# Patient Record
Sex: MALE | Race: WHITE
[De-identification: names, ages, dates, MRNs, and addresses within clinical notes are randomized per-mention and may not be internally consistent; named-entity substitution may affect disease eponyms.]

---

## 2019-10-25 ENCOUNTER — HOSPITAL ENCOUNTER (EMERGENCY)
Dept: HOSPITAL 47 - EC | Age: 24
Discharge: HOME | End: 2019-10-25
Payer: COMMERCIAL

## 2019-10-25 VITALS — HEART RATE: 115 BPM | SYSTOLIC BLOOD PRESSURE: 137 MMHG | DIASTOLIC BLOOD PRESSURE: 87 MMHG

## 2019-10-25 VITALS — TEMPERATURE: 98.1 F | RESPIRATION RATE: 18 BRPM

## 2019-10-25 DIAGNOSIS — Z85.9: ICD-10-CM

## 2019-10-25 DIAGNOSIS — Z94.81: ICD-10-CM

## 2019-10-25 DIAGNOSIS — F41.0: Primary | ICD-10-CM

## 2019-10-25 DIAGNOSIS — Z63.0: ICD-10-CM

## 2019-10-25 DIAGNOSIS — F41.9: ICD-10-CM

## 2019-10-25 DIAGNOSIS — R00.0: ICD-10-CM

## 2019-10-25 DIAGNOSIS — F17.200: ICD-10-CM

## 2019-10-25 PROCEDURE — 99283 EMERGENCY DEPT VISIT LOW MDM: CPT

## 2019-10-25 SDOH — SOCIAL STABILITY - SOCIAL INSECURITY: PROBLEMS IN RELATIONSHIP WITH SPOUSE OR PARTNER: Z63.0

## 2019-10-25 NOTE — ED
Anxiety HPI





- General


Chief Complaint: Anxiety


Stated Complaint: Anxiety


Time Seen by Provider: 10/25/19 17:21


Source: patient, RN notes reviewed, old records reviewed


Mode of arrival: ambulatory





- History of Present Illness


Initial Comments: 





This is a 24-year-old male the ER for evaluation patient admits to significant 

anxiety and stress, inability to sleep panic attacks 3 last 2 days.  Patient 

having multiple family issues recent passing of his mother because of severe 

distress and also his girlfriend is broke up with him.  He denies any illicit 

drugs or alcohol abuse he does take Vistaril on a regular basis for generalized 

anxiety states that right now is is not cutting it and not getting it done.  

Patient has no thoughts of self-harm or hurting someone else.  No other 

significant complaints


MD Complaint: anxiety


-: days(s)


Symptoms: palpitations, other (Panic attack)


Place: home


Previous History of Same: Yes


Severity: mild


Quality: intermittent, similar to prior episodes


Provoking factors: emotional stress (Girlfriend broke up with), recent 

death/illness of family member


Improves With: nothing


Worsens With: nothing





- Related Data


Home Medications: 


                                  Previous Rx's











 Medication  Instructions  Recorded


 


Diazepam [Valium] 5 mg PO Q8H PRN 3 Days #9 tab 10/25/19











Allergies/Adverse Reactions: 


                                    Allergies











Allergy/AdvReac Type Severity Reaction Status Date / Time


 


No Known Allergies Allergy   Verified 10/25/19 16:19














Review of Systems


ROS Statement: 


Those systems with pertinent positive or pertinent negative responses have been 

documented in the HPI.





ROS Other: All systems not noted in ROS Statement are negative.





Past Medical History


Past Medical History: Cancer


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 

old


History of Any Multi-Drug Resistant Organisms: None Reported


Past Surgical History: No Surgical Hx Reported


Past Psychological History: Anxiety


Smoking Status: Current every day smoker


Past Alcohol Use History: None Reported


Past Drug Use History: None Reported





General Exam


Limitations: no limitations


General appearance: alert, in no apparent distress


Head exam: Present: atraumatic, normocephalic, normal inspection


Eye exam: Present: normal appearance, PERRL, EOMI.  Absent: scleral icterus, 

conjunctival injection, periorbital swelling


ENT exam: Present: normal exam, mucous membranes moist


Neck exam: Present: normal inspection.  Absent: tenderness, meningismus, 

lymphadenopathy


Respiratory exam: Present: normal lung sounds bilaterally.  Absent: respiratory 

distress, wheezes, rales, rhonchi, stridor


Cardiovascular Exam: Present: normal rhythm, tachycardia, normal heart sounds.  

Absent: systolic murmur, diastolic murmur, rubs, gallop, clicks


GI/Abdominal exam: Present: soft, normal bowel sounds.  Absent: distended, 

tenderness, guarding, rebound, rigid


Extremities exam: Present: normal inspection, full ROM, normal capillary refill.

  Absent: tenderness, pedal edema, joint swelling, calf tenderness


Back exam: Present: normal inspection


Neurological exam: Present: alert, oriented X3, CN II-XII intact


Psychiatric exam: Present: normal affect, normal mood


Skin exam: Present: warm, dry, intact, normal color.  Absent: rash





Course





                                   Vital Signs











  10/25/19





  16:14


 


Temperature 98.1 F


 


Pulse Rate 144 H


 


Respiratory 18





Rate 


 


Blood Pressure 129/64


 


O2 Sat by Pulse 98





Oximetry 














- Reevaluation(s)


Reevaluation #1: 





10/25/19 17:43


Medical records reviewed


Reevaluation #2: 





10/25/19 17:59


Patient's anxiety is significantly improved





Medical Decision Making





- Medical Decision Making





24 male the ER for evaluation anxiety attack.  Patient will be given anxiety 

like for discharge, follow-up with primary care next week





Disposition


Clinical Impression: 


 Acute anxiety, Panic attack





Disposition: HOME SELF-CARE


Condition: Fair


Instructions (If sedation given, give patient instructions):  Generalized 

Anxiety Disorder (ED)


Prescriptions: 


Diazepam [Valium] 5 mg PO Q8H PRN 3 Days #9 tab


 PRN Reason: Anxiety


Is patient prescribed a controlled substance at d/c from ED?: Yes


When asked, does pt state using other controlled substances?: No


If prescribed controlled substance>3 days was MAPS reviewed?: Prescribed <3 Days


Referrals: 


Nonstaff,Physician [Primary Care Provider] - 1-2 days

## 2019-10-29 ENCOUNTER — HOSPITAL ENCOUNTER (EMERGENCY)
Dept: HOSPITAL 47 - EC | Age: 24
Discharge: HOME | End: 2019-10-29
Payer: COMMERCIAL

## 2019-10-29 VITALS
RESPIRATION RATE: 16 BRPM | HEART RATE: 72 BPM | SYSTOLIC BLOOD PRESSURE: 132 MMHG | DIASTOLIC BLOOD PRESSURE: 76 MMHG | TEMPERATURE: 98 F

## 2019-10-29 DIAGNOSIS — R11.2: Primary | ICD-10-CM

## 2019-10-29 DIAGNOSIS — R19.7: ICD-10-CM

## 2019-10-29 DIAGNOSIS — Z85.830: ICD-10-CM

## 2019-10-29 DIAGNOSIS — F41.9: ICD-10-CM

## 2019-10-29 DIAGNOSIS — F17.200: ICD-10-CM

## 2019-10-29 DIAGNOSIS — Z94.81: ICD-10-CM

## 2019-10-29 PROCEDURE — 96374 THER/PROPH/DIAG INJ IV PUSH: CPT

## 2019-10-29 PROCEDURE — 99283 EMERGENCY DEPT VISIT LOW MDM: CPT

## 2019-10-29 PROCEDURE — 96361 HYDRATE IV INFUSION ADD-ON: CPT

## 2019-10-29 NOTE — ED
Nausea/Vomiting/Diarrhea HPI





- General


Chief complaint: Nausea/Vomiting/Diarrhea


Stated complaint: Vomiting


Time Seen by Provider: 10/29/19 09:15


Source: patient


Mode of arrival: ambulatory


Limitations: no limitations





- History of Present Illness


Initial comments: 


24-year-old male with history of anxiety disorder presents emergency room for 

evaluation of nausea vomiting diarrhea 12 hours.  Patient states that he has 

had nausea vomiting diarrhea since 9:30 PM last night.  Patient denies any 

specific foods or ingested prior.  Patient denies any fevers states she has had 

chills.  Patient denies any severe abdominal pain.  Denies any melena or 

hematochezia.  Denies any emesis.  Patient denies any known sick contacts.  

Patient states the vomiting has slowed down this morning.  Patient states he 

does feel anxious and did not take his anti anxiety medications-5mg valium. 

Otherwise patient has no other complaints. Denies CP, SOB, back pain or urinary 

symptoms. Patient appears well on arrival.








- Related Data


                                  Previous Rx's











 Medication  Instructions  Recorded


 


Diazepam [Valium] 5 mg PO Q8H PRN 3 Days #9 tab 10/25/19


 


Ondansetron Odt [Zofran Odt] 4 mg PO Q8HR PRN 1 Days #3 tab 10/29/19











                                    Allergies











Allergy/AdvReac Type Severity Reaction Status Date / Time


 


No Known Allergies Allergy   Verified 10/29/19 09:19














Review of Systems


ROS Statement: 


Those systems with pertinent positive or pertinent negative responses have been 

documented in the HPI.





ROS Other: All systems not noted in ROS Statement are negative.





Past Medical History


Past Medical History: Cancer


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 

old, leukemia


History of Any Multi-Drug Resistant Organisms: None Reported


Past Surgical History: No Surgical Hx Reported


Past Psychological History: Anxiety


Smoking Status: Current every day smoker


Past Alcohol Use History: None Reported


Past Drug Use History: None Reported





General Exam





- General Exam Comments


Initial Comments: 


General:  The patient is awake and alert, in no distress, and does not appear 

acutely ill. 


Eye:  Pupils are equal, round and reactive to light, extra-ocular movements are 

intact.  No nystagmus.  There is normal conjunctiva bilaterally.  No signs of 

icterus.  


Ears, nose, mouth and throat:  There are moist mucous membranes and no oral 

lesions.   


Cardiovascular:  There is a regular rate and rhythm. No murmur, rub or gallop is

appreciated.


Respiratory:  Lungs are clear to auscultation, respirations are non-labored, 

breath sounds are equal.  No wheezes, stridor, rales, or rhonchi.


Gastrointestinal:  Soft, non-distended, non-tender abdomen without masses or 

organomegaly noted. There is no rebound or guarding present.  No CVA tenderness.

Bowel sounds are unremarkable.


Musculoskeletal:  Normal ROM, no tenderness.  Strength 5/5. Sensation intact. 

Radial pulses equal bilaterally 2+.  


Neurological:  A&O x 3. CN II-XII intact grossly, There are no obvious motor or 

sensory deficits. Coordination appears grossly intact. Speech is normal.


Skin:  Skin is warm and dry and no rashes or lesions are noted. 


Psychiatric:  Cooperativet.  





Limitations: no limitations





Course


                                   Vital Signs











  10/29/19 10/29/19





  09:10 10:31


 


Temperature 98.2 F 98.0 F


 


Pulse Rate 84 72


 


Respiratory 18 16





Rate  


 


Blood Pressure 119/71 132/76


 


O2 Sat by Pulse 98 99





Oximetry  














Medical Decision Making





- Medical Decision Making


24-year-old male presents emergency department for evaluation of vomiting, 

diarrhea. began last night, denies abdominal pain. abdominal exam benign, 

symptoms controlled. given IVF. Patient states he has chronic anxiety with f/u 

tomorrow to refill valium. Given one dose of PO xanax in the ER. Patient 

discharged appearing well with PCP f/u. Return parameters discussed case 

discussed with Dr. landry. 








Disposition


Clinical Impression: 


 Nausea vomiting and diarrhea, Hx of anxiety disorder





Disposition: HOME SELF-CARE


Condition: Good


Instructions (If sedation given, give patient instructions):  Acute Nausea and 

Vomiting (ED), Acute Diarrhea (ED)


Additional Instructions: 


Please use medication as discussed.  Please follow-up with family doctor in the 

next 2 days.  Please return to emergency room if the symptoms increase or worsen

or for any other concerns.


Prescriptions: 


Ondansetron Odt [Zofran Odt] 4 mg PO Q8HR PRN 1 Days #3 tab


 PRN Reason: Nausea


Is patient prescribed a controlled substance at d/c from ED?: No


Referrals: 


Nonstaff,Physician [Primary Care Provider] - 1-2 days


[facility] [NON-STAFF] - 1-2 days


Time of Disposition: 11:24